# Patient Record
Sex: FEMALE | Race: BLACK OR AFRICAN AMERICAN | Employment: PART TIME | RURAL
[De-identification: names, ages, dates, MRNs, and addresses within clinical notes are randomized per-mention and may not be internally consistent; named-entity substitution may affect disease eponyms.]

---

## 2023-02-04 ENCOUNTER — HOSPITAL ENCOUNTER (EMERGENCY)
Age: 53
Discharge: HOME | End: 2023-02-04
Attending: EMERGENCY MEDICINE
Payer: COMMERCIAL

## 2023-02-04 VITALS
BODY MASS INDEX: 39.87 KG/M2 | HEART RATE: 94 BPM | RESPIRATION RATE: 17 BRPM | OXYGEN SATURATION: 100 % | SYSTOLIC BLOOD PRESSURE: 198 MMHG | DIASTOLIC BLOOD PRESSURE: 98 MMHG | HEIGHT: 63 IN | WEIGHT: 225 LBS | TEMPERATURE: 99.2 F

## 2023-02-04 DIAGNOSIS — K04.7 DENTAL ABSCESS: Primary | ICD-10-CM

## 2023-02-04 PROCEDURE — 96372 THER/PROPH/DIAG INJ SC/IM: CPT

## 2023-02-04 PROCEDURE — 74011250636 HC RX REV CODE- 250/636: Performed by: EMERGENCY MEDICINE

## 2023-02-04 PROCEDURE — 74011250637 HC RX REV CODE- 250/637: Performed by: EMERGENCY MEDICINE

## 2023-02-04 PROCEDURE — 74011000250 HC RX REV CODE- 250: Performed by: EMERGENCY MEDICINE

## 2023-02-04 PROCEDURE — 99284 EMERGENCY DEPT VISIT MOD MDM: CPT

## 2023-02-04 RX ORDER — HYDROCODONE BITARTRATE AND ACETAMINOPHEN 5; 325 MG/1; MG/1
1 TABLET ORAL
Qty: 12 TABLET | Refills: 0 | Status: SHIPPED | OUTPATIENT
Start: 2023-02-04 | End: 2023-02-05 | Stop reason: CLARIF

## 2023-02-04 RX ORDER — HYDROCODONE BITARTRATE AND ACETAMINOPHEN 7.5; 325 MG/15ML; MG/15ML
10 SOLUTION ORAL
Status: COMPLETED | OUTPATIENT
Start: 2023-02-04 | End: 2023-02-04

## 2023-02-04 RX ORDER — CLINDAMYCIN HYDROCHLORIDE 300 MG/1
300 CAPSULE ORAL 4 TIMES DAILY
Qty: 40 CAPSULE | Refills: 0 | Status: SHIPPED | OUTPATIENT
Start: 2023-02-04

## 2023-02-04 RX ADMIN — HYDROCODONE BITARTRATE AND ACETAMINOPHEN 10 MG: 7.5; 325 SOLUTION ORAL at 18:01

## 2023-02-04 RX ADMIN — LIDOCAINE HYDROCHLORIDE 1 G: 10 INJECTION, SOLUTION INFILTRATION; PERINEURAL at 18:03

## 2023-02-04 NOTE — ED TRIAGE NOTES
Pt arrives with c/o swollen left cheek. States that it began yesterday but was worse upon waking this morning. No issue with swallowing.  Painful, and warm to touch

## 2023-02-04 NOTE — ED PROVIDER NOTES
EMERGENCY DEPARTMENT HISTORY AND PHYSICAL EXAM      Date: 2023  Patient Name: Collette Hassan    History of Presenting Illness     Chief Complaint   Patient presents with    Facial Swelling       History Provided By: Patient    HPI: Collette Hassan, 50 y.o. female with past medical history listed below, presents via private vehicle to the ED with cc of facial swelling. Patient reports she started with some pain in the left lower premolar area tooth 3 days ago with progressively worsening pain and swelling of her left face and jaw area. No neck swelling. Has been tolerating p.o. without difficulty. No fevers or chills. There are no other complaints, changes, or physical findings at this time. PCP: Other, MD Lynnette    No current facility-administered medications on file prior to encounter. Current Outpatient Medications on File Prior to Encounter   Medication Sig Dispense Refill    labetalol (NORMODYNE) 100 mg tablet Take 1 Tab by mouth every twelve (12) hours. 60 Tab 1    ibuprofen (MOTRIN) 600 mg tablet Take 1 Tab by mouth every six (6) hours as needed for Pain. 40 Tab 2    oxyCODONE-acetaminophen (PERCOCET) 5-325 mg per tablet Take 1 Tab by mouth every four (4) hours as needed for Pain. 36 Tab 0       Past History     Past Medical History:  Past Medical History:   Diagnosis Date    Anemia        Past Surgical History:  Past Surgical History:   Procedure Laterality Date    [place]  DELIVERY ONLY         Family History:  Family History   Problem Relation Age of Onset    Stroke Maternal Grandmother        Social History:  Social History     Tobacco Use    Smoking status: Never   Substance Use Topics    Alcohol use: No    Drug use: No       Allergies: Allergies   Allergen Reactions    Seafood Anaphylaxis     Epi-pen . Review of Systems   Review of Systems   Constitutional:  Negative for activity change, chills and fever. HENT:  Positive for dental problem and facial swelling. Negative for congestion and sore throat. Eyes:  Negative for pain and redness. Respiratory:  Negative for cough, chest tightness and shortness of breath. Cardiovascular:  Negative for chest pain and palpitations. Gastrointestinal:  Negative for abdominal pain, diarrhea, nausea and vomiting. Genitourinary:  Negative for dysuria, frequency and urgency. Musculoskeletal:  Negative for back pain and neck pain. Skin:  Negative for rash. Neurological:  Negative for syncope, light-headedness and headaches. Psychiatric/Behavioral:  Negative for confusion. All other systems reviewed and are negative. Physical Exam     Physical Exam  Vitals and nursing note reviewed. Constitutional:       General: She is not in acute distress. Appearance: She is well-developed. She is not diaphoretic. HENT:      Head: Normocephalic and atraumatic. Eyes:      General: No scleral icterus. Conjunctiva/sclera: Conjunctivae normal.      Pupils: Pupils are equal, round, and reactive to light. Pulmonary:      Effort: Pulmonary effort is normal.   Musculoskeletal:         General: Normal range of motion. Skin:     General: Skin is warm and dry. Findings: No rash. Neurological:      Mental Status: She is alert and oriented to person, place, and time. Psychiatric:         Behavior: Behavior normal.             Diagnostic Study Results     Labs -   No results found for this or any previous visit (from the past 12 hour(s)). Radiologic Studies -   No orders to display     CT Results  (Last 48 hours)      None          CXR Results  (Last 48 hours)      None              Medical Decision Making   I am the first provider for this patient. I reviewed the vital signs, available nursing notes, past medical history, past surgical history, family history and social history. Vital Signs-Reviewed the patient's vital signs.   Patient Vitals for the past 12 hrs:   Temp Pulse Resp BP SpO2   02/04/23 1721 99.2 °F (37.3 °C) 90 18 (!) 201/88 100 %           Records Reviewed: Nursing notes reviewed    Provider Notes (Medical Decision Making):   DDX: 72-year-old female with left lower third premolar probable abscess. No trismus. Mild facial swelling. Patient afebrile. Patient tolerating p.o. without difficulty. Gave IM ceftriaxone here due to limited pharmacy options in this area on the weekends. Will discharge with p.o. clindamycin and hydrocodone for pain. Patient states she has a dentist also gave [facility] dental surgery for follow-up as well. Patient markedly hypertensive on triage at [id number]. Most likely secondary to patient's discomfort. Will reassess. ED Course:   Initial assessment performed. The patients presenting problems have been discussed, and they are in agreement with the care plan formulated and outlined with them. I have encouraged them to ask questions as they arise throughout their visit. Progress note:    Pt noted to be feeling better and ready for discharge. Updated pt and/or family on all final lab and/or  imaging findings. Will follow up as instructed. All questions have been answered, pt voiced understanding and agreement with plan. Specific return precautions provided as well as instructions to return to the ED should sx worsen at any time. Vital signs stable for discharge. I have also put together some discharge instructions for them that include: 1) educational information regarding their diagnosis, 2) how to care for their diagnosis at home, as well a 3) list of reasons why they would want to return to the ED prior to their follow-up appointment, should their condition change. Written by Bo Mccray MD        Critical Care Time:   0    Disposition:  Discharge    PLAN:  1. Current Discharge Medication List        START taking these medications    Details   clindamycin (CLEOCIN) 300 mg capsule Take 1 Capsule by mouth four (4) times daily.   Qty: 40 Capsule, Refills: 0  Start date: 2/4/2023      HYDROcodone-acetaminophen (Norco) 5-325 mg per tablet Take 1 Tablet by mouth every four (4) hours as needed for Pain for up to 3 days. Max Daily Amount: 6 Tablets. Qty: 12 Tablet, Refills: 0  Start date: 2/4/2023, End date: 2/7/2023    Associated Diagnoses: Dental abscess           2. Follow-up Information    None       Return to ED if worse     Diagnosis     Clinical Impression:   1. Dental abscess              Please note that this dictation was completed with [employer], the computer voice recognition software. Quite often unanticipated grammatical, syntax, homophones, and other interpretive errors are inadvertently transcribed by the computer software. Please disregard these errors. Please excuse any errors that have escaped final proofreading.

## 2023-02-05 RX ORDER — OXYCODONE AND ACETAMINOPHEN 5; 325 MG/1; MG/1
1 TABLET ORAL
Qty: 12 TABLET | Refills: 0 | Status: SHIPPED | OUTPATIENT
Start: 2023-02-05 | End: 2023-02-08